# Patient Record
Sex: MALE | Race: WHITE | URBAN - METROPOLITAN AREA
[De-identification: names, ages, dates, MRNs, and addresses within clinical notes are randomized per-mention and may not be internally consistent; named-entity substitution may affect disease eponyms.]

---

## 2017-10-02 ENCOUNTER — HOSPITAL ENCOUNTER (OUTPATIENT)
Dept: BONE DENSITY | Facility: CLINIC | Age: 62
Discharge: HOME | End: 2017-10-02
Attending: INTERNAL MEDICINE | Admitting: INTERNAL MEDICINE

## 2017-10-02 DIAGNOSIS — K52.9 CHRONIC DIARRHEA: ICD-10-CM

## 2017-10-02 PROCEDURE — 77080 DXA BONE DENSITY AXIAL: CPT

## 2018-09-30 ENCOUNTER — HOSPITAL ENCOUNTER (OUTPATIENT)
Facility: CLINIC | Age: 63
Setting detail: OBSERVATION
Discharge: HOME | End: 2018-10-01
Attending: EMERGENCY MEDICINE | Admitting: INTERNAL MEDICINE
Payer: COMMERCIAL

## 2018-09-30 DIAGNOSIS — R55 NEAR SYNCOPE: ICD-10-CM

## 2018-09-30 DIAGNOSIS — I48.91 ATRIAL FIBRILLATION WITH RVR (H): ICD-10-CM

## 2018-09-30 LAB
ALBUMIN SERPL-MCNC: 3.7 G/DL (ref 3.4–5)
ALP SERPL-CCNC: 71 U/L (ref 40–150)
ALT SERPL W P-5'-P-CCNC: 49 U/L (ref 0–70)
ANION GAP SERPL CALCULATED.3IONS-SCNC: 5 MMOL/L (ref 3–14)
AST SERPL W P-5'-P-CCNC: 31 U/L (ref 0–45)
BASOPHILS # BLD AUTO: 0 10E9/L (ref 0–0.2)
BASOPHILS NFR BLD AUTO: 0.3 %
BILIRUB SERPL-MCNC: 0.7 MG/DL (ref 0.2–1.3)
BUN SERPL-MCNC: 9 MG/DL (ref 7–30)
CALCIUM SERPL-MCNC: 8.8 MG/DL (ref 8.5–10.1)
CHLORIDE SERPL-SCNC: 102 MMOL/L (ref 94–109)
CHOLEST SERPL-MCNC: 141 MG/DL
CO2 SERPL-SCNC: 31 MMOL/L (ref 20–32)
CREAT SERPL-MCNC: 0.66 MG/DL (ref 0.66–1.25)
DIFFERENTIAL METHOD BLD: ABNORMAL
EOSINOPHIL # BLD AUTO: 0.2 10E9/L (ref 0–0.7)
EOSINOPHIL NFR BLD AUTO: 3.1 %
ERYTHROCYTE [DISTWIDTH] IN BLOOD BY AUTOMATED COUNT: 12.3 % (ref 10–15)
GFR SERPL CREATININE-BSD FRML MDRD: >90 ML/MIN/1.7M2
GLUCOSE SERPL-MCNC: 114 MG/DL (ref 70–99)
HCT VFR BLD AUTO: 47.9 % (ref 40–53)
HDLC SERPL-MCNC: 79 MG/DL
HGB BLD-MCNC: 17 G/DL (ref 13.3–17.7)
IMM GRANULOCYTES # BLD: 0 10E9/L (ref 0–0.4)
IMM GRANULOCYTES NFR BLD: 0.2 %
INR PPP: 0.99 (ref 0.86–1.14)
INTERPRETATION ECG - MUSE: NORMAL
LDLC SERPL CALC-MCNC: 48 MG/DL
LMWH PPP CHRO-ACNC: 0.29 IU/ML
LYMPHOCYTES # BLD AUTO: 1.8 10E9/L (ref 0.8–5.3)
LYMPHOCYTES NFR BLD AUTO: 31.4 %
MAGNESIUM SERPL-MCNC: 2.2 MG/DL (ref 1.6–2.3)
MCH RBC QN AUTO: 34.5 PG (ref 26.5–33)
MCHC RBC AUTO-ENTMCNC: 35.5 G/DL (ref 31.5–36.5)
MCV RBC AUTO: 97 FL (ref 78–100)
MONOCYTES # BLD AUTO: 0.8 10E9/L (ref 0–1.3)
MONOCYTES NFR BLD AUTO: 12.9 %
NEUTROPHILS # BLD AUTO: 3 10E9/L (ref 1.6–8.3)
NEUTROPHILS NFR BLD AUTO: 52.1 %
NONHDLC SERPL-MCNC: 62 MG/DL
NRBC # BLD AUTO: 0 10*3/UL
NRBC BLD AUTO-RTO: 0 /100
PLATELET # BLD AUTO: 223 10E9/L (ref 150–450)
POTASSIUM SERPL-SCNC: 4.2 MMOL/L (ref 3.4–5.3)
POTASSIUM SERPL-SCNC: 4.2 MMOL/L (ref 3.4–5.3)
PROT SERPL-MCNC: 7.3 G/DL (ref 6.8–8.8)
RBC # BLD AUTO: 4.93 10E12/L (ref 4.4–5.9)
SODIUM SERPL-SCNC: 138 MMOL/L (ref 133–144)
TRIGL SERPL-MCNC: 69 MG/DL
TROPONIN I SERPL-MCNC: <0.015 UG/L (ref 0–0.04)
WBC # BLD AUTO: 5.8 10E9/L (ref 4–11)

## 2018-09-30 PROCEDURE — 25000128 H RX IP 250 OP 636: Performed by: EMERGENCY MEDICINE

## 2018-09-30 PROCEDURE — 25000132 ZZH RX MED GY IP 250 OP 250 PS 637

## 2018-09-30 PROCEDURE — 80061 LIPID PANEL: CPT | Performed by: EMERGENCY MEDICINE

## 2018-09-30 PROCEDURE — 99207 ZZC MOONLIGHTING INDICATOR: CPT | Performed by: INTERNAL MEDICINE

## 2018-09-30 PROCEDURE — 84132 ASSAY OF SERUM POTASSIUM: CPT | Performed by: INTERNAL MEDICINE

## 2018-09-30 PROCEDURE — 99214 OFFICE O/P EST MOD 30 MIN: CPT | Performed by: INTERNAL MEDICINE

## 2018-09-30 PROCEDURE — 85025 COMPLETE CBC W/AUTO DIFF WBC: CPT | Performed by: EMERGENCY MEDICINE

## 2018-09-30 PROCEDURE — 84484 ASSAY OF TROPONIN QUANT: CPT | Performed by: EMERGENCY MEDICINE

## 2018-09-30 PROCEDURE — 25000125 ZZHC RX 250: Performed by: EMERGENCY MEDICINE

## 2018-09-30 PROCEDURE — 80053 COMPREHEN METABOLIC PANEL: CPT | Performed by: EMERGENCY MEDICINE

## 2018-09-30 PROCEDURE — 36415 COLL VENOUS BLD VENIPUNCTURE: CPT | Performed by: INTERNAL MEDICINE

## 2018-09-30 PROCEDURE — 84443 ASSAY THYROID STIM HORMONE: CPT | Performed by: INTERNAL MEDICINE

## 2018-09-30 PROCEDURE — 99203 OFFICE O/P NEW LOW 30 MIN: CPT | Performed by: INTERNAL MEDICINE

## 2018-09-30 PROCEDURE — 99285 EMERGENCY DEPT VISIT HI MDM: CPT | Mod: 25

## 2018-09-30 PROCEDURE — 96365 THER/PROPH/DIAG IV INF INIT: CPT

## 2018-09-30 PROCEDURE — 93005 ELECTROCARDIOGRAM TRACING: CPT

## 2018-09-30 PROCEDURE — 85520 HEPARIN ASSAY: CPT | Performed by: INTERNAL MEDICINE

## 2018-09-30 PROCEDURE — 96376 TX/PRO/DX INJ SAME DRUG ADON: CPT

## 2018-09-30 PROCEDURE — 21400004 ZZH R&B CCU CSC INTERMEDIATE

## 2018-09-30 PROCEDURE — 25000132 ZZH RX MED GY IP 250 OP 250 PS 637: Performed by: INTERNAL MEDICINE

## 2018-09-30 PROCEDURE — 99223 1ST HOSP IP/OBS HIGH 75: CPT | Mod: AI | Performed by: INTERNAL MEDICINE

## 2018-09-30 PROCEDURE — 85610 PROTHROMBIN TIME: CPT | Performed by: INTERNAL MEDICINE

## 2018-09-30 PROCEDURE — 96361 HYDRATE IV INFUSION ADD-ON: CPT

## 2018-09-30 PROCEDURE — 83735 ASSAY OF MAGNESIUM: CPT | Performed by: INTERNAL MEDICINE

## 2018-09-30 PROCEDURE — 96368 THER/DIAG CONCURRENT INF: CPT

## 2018-09-30 RX ORDER — NALOXONE HYDROCHLORIDE 0.4 MG/ML
.1-.4 INJECTION, SOLUTION INTRAMUSCULAR; INTRAVENOUS; SUBCUTANEOUS
Status: DISCONTINUED | OUTPATIENT
Start: 2018-10-01 | End: 2018-10-01

## 2018-09-30 RX ORDER — HYDROCODONE BITARTRATE AND ACETAMINOPHEN 7.5; 325 MG/1; MG/1
1-2 TABLET ORAL EVERY 6 HOURS PRN
Status: DISCONTINUED | OUTPATIENT
Start: 2018-09-30 | End: 2018-09-30

## 2018-09-30 RX ORDER — ESCITALOPRAM OXALATE 10 MG/1
20 TABLET ORAL DAILY
Status: DISCONTINUED | OUTPATIENT
Start: 2018-09-30 | End: 2018-10-01 | Stop reason: HOSPADM

## 2018-09-30 RX ORDER — GABAPENTIN 300 MG/1
300 CAPSULE ORAL 3 TIMES DAILY
Status: DISCONTINUED | OUTPATIENT
Start: 2018-09-30 | End: 2018-10-01 | Stop reason: HOSPADM

## 2018-09-30 RX ORDER — GABAPENTIN 600 MG/1
600 TABLET ORAL 3 TIMES DAILY
Status: DISCONTINUED | OUTPATIENT
Start: 2018-09-30 | End: 2018-10-01 | Stop reason: HOSPADM

## 2018-09-30 RX ORDER — LORAZEPAM 0.5 MG/1
0.5 TABLET ORAL DAILY PRN
Status: DISCONTINUED | OUTPATIENT
Start: 2018-09-30 | End: 2018-10-01 | Stop reason: HOSPADM

## 2018-09-30 RX ORDER — PANTOPRAZOLE SODIUM 20 MG/1
20 TABLET, DELAYED RELEASE ORAL
Status: DISCONTINUED | OUTPATIENT
Start: 2018-09-30 | End: 2018-10-01 | Stop reason: HOSPADM

## 2018-09-30 RX ORDER — ASPIRIN 81 MG/1
81 TABLET ORAL DAILY
COMMUNITY
Start: 2018-04-04

## 2018-09-30 RX ORDER — BUPROPION HYDROCHLORIDE 300 MG/1
300 TABLET ORAL DAILY
COMMUNITY
Start: 2018-06-25

## 2018-09-30 RX ORDER — FLUMAZENIL 0.1 MG/ML
0.2 INJECTION, SOLUTION INTRAVENOUS
Status: DISCONTINUED | OUTPATIENT
Start: 2018-10-01 | End: 2018-10-01 | Stop reason: HOSPADM

## 2018-09-30 RX ORDER — ALBUTEROL SULFATE 90 UG/1
1 AEROSOL, METERED RESPIRATORY (INHALATION) EVERY 6 HOURS PRN
COMMUNITY
Start: 2016-06-08

## 2018-09-30 RX ORDER — DILTIAZEM HYDROCHLORIDE 5 MG/ML
15 INJECTION INTRAVENOUS ONCE
Status: COMPLETED | OUTPATIENT
Start: 2018-09-30 | End: 2018-09-30

## 2018-09-30 RX ORDER — BUPROPION HYDROCHLORIDE 150 MG/1
150 TABLET ORAL DAILY
Status: DISCONTINUED | OUTPATIENT
Start: 2018-09-30 | End: 2018-10-01 | Stop reason: HOSPADM

## 2018-09-30 RX ORDER — HEPARIN SODIUM 5000 [USP'U]/.5ML
5000 INJECTION, SOLUTION INTRAVENOUS; SUBCUTANEOUS
Status: DISCONTINUED | OUTPATIENT
Start: 2018-09-30 | End: 2018-09-30

## 2018-09-30 RX ORDER — GLYCOPYRROLATE 0.2 MG/ML
0.1 INJECTION, SOLUTION INTRAMUSCULAR; INTRAVENOUS ONCE
Status: COMPLETED | OUTPATIENT
Start: 2018-10-01 | End: 2018-10-01

## 2018-09-30 RX ORDER — SOTALOL HYDROCHLORIDE 80 MG/1
160 TABLET ORAL 2 TIMES DAILY
Status: DISCONTINUED | OUTPATIENT
Start: 2018-09-30 | End: 2018-10-01 | Stop reason: HOSPADM

## 2018-09-30 RX ORDER — ATORVASTATIN CALCIUM 20 MG/1
20 TABLET, FILM COATED ORAL DAILY
COMMUNITY
Start: 2018-06-25

## 2018-09-30 RX ORDER — HYDROCODONE BITARTRATE AND ACETAMINOPHEN 7.5; 325 MG/1; MG/1
1 TABLET ORAL EVERY 4 HOURS PRN
Status: DISCONTINUED | OUTPATIENT
Start: 2018-09-30 | End: 2018-09-30

## 2018-09-30 RX ORDER — ACETAMINOPHEN 650 MG/1
650 SUPPOSITORY RECTAL EVERY 4 HOURS PRN
Status: DISCONTINUED | OUTPATIENT
Start: 2018-09-30 | End: 2018-09-30

## 2018-09-30 RX ORDER — AMOXICILLIN 250 MG
2 CAPSULE ORAL 2 TIMES DAILY PRN
Status: DISCONTINUED | OUTPATIENT
Start: 2018-09-30 | End: 2018-10-01 | Stop reason: HOSPADM

## 2018-09-30 RX ORDER — ONDANSETRON 2 MG/ML
4 INJECTION INTRAMUSCULAR; INTRAVENOUS EVERY 6 HOURS PRN
Status: DISCONTINUED | OUTPATIENT
Start: 2018-09-30 | End: 2018-10-01 | Stop reason: HOSPADM

## 2018-09-30 RX ORDER — HYDROCODONE BITARTRATE AND ACETAMINOPHEN 7.5; 325 MG/1; MG/1
1-2 TABLET ORAL EVERY 4 HOURS PRN
Status: DISCONTINUED | OUTPATIENT
Start: 2018-09-30 | End: 2018-10-01 | Stop reason: HOSPADM

## 2018-09-30 RX ORDER — ASPIRIN 81 MG/1
324 TABLET, CHEWABLE ORAL ONCE
Status: COMPLETED | OUTPATIENT
Start: 2018-09-30 | End: 2018-09-30

## 2018-09-30 RX ORDER — ALBUTEROL SULFATE 90 UG/1
1 AEROSOL, METERED RESPIRATORY (INHALATION) EVERY 6 HOURS PRN
Status: DISCONTINUED | OUTPATIENT
Start: 2018-09-30 | End: 2018-10-01 | Stop reason: HOSPADM

## 2018-09-30 RX ORDER — AMOXICILLIN 250 MG
1 CAPSULE ORAL 2 TIMES DAILY PRN
Status: DISCONTINUED | OUTPATIENT
Start: 2018-09-30 | End: 2018-10-01 | Stop reason: HOSPADM

## 2018-09-30 RX ORDER — ATORVASTATIN CALCIUM 20 MG/1
20 TABLET, FILM COATED ORAL EVERY EVENING
Status: DISCONTINUED | OUTPATIENT
Start: 2018-09-30 | End: 2018-10-01 | Stop reason: HOSPADM

## 2018-09-30 RX ORDER — TRAZODONE HYDROCHLORIDE 100 MG/1
100 TABLET ORAL AT BEDTIME
COMMUNITY
Start: 2018-06-07

## 2018-09-30 RX ORDER — FENTANYL CITRATE 50 UG/ML
50-100 INJECTION, SOLUTION INTRAMUSCULAR; INTRAVENOUS
Status: COMPLETED | OUTPATIENT
Start: 2018-10-01 | End: 2018-10-01

## 2018-09-30 RX ORDER — ESCITALOPRAM OXALATE 20 MG/1
20 TABLET ORAL DAILY
COMMUNITY
Start: 2018-08-22

## 2018-09-30 RX ORDER — SODIUM CHLORIDE 9 MG/ML
1000 INJECTION, SOLUTION INTRAVENOUS CONTINUOUS
Status: DISCONTINUED | OUTPATIENT
Start: 2018-10-01 | End: 2018-10-01 | Stop reason: HOSPADM

## 2018-09-30 RX ORDER — ASPIRIN 81 MG/1
81 TABLET ORAL DAILY
Status: DISCONTINUED | OUTPATIENT
Start: 2018-09-30 | End: 2018-09-30

## 2018-09-30 RX ORDER — HEPARIN SODIUM 5000 [USP'U]/.5ML
5000 INJECTION, SOLUTION INTRAVENOUS; SUBCUTANEOUS EVERY 12 HOURS
Status: DISCONTINUED | OUTPATIENT
Start: 2018-09-30 | End: 2018-09-30

## 2018-09-30 RX ORDER — LORAZEPAM 0.5 MG/1
0.5 TABLET ORAL DAILY PRN
COMMUNITY
Start: 2018-08-24

## 2018-09-30 RX ORDER — BUPROPION HYDROCHLORIDE 150 MG/1
300 TABLET ORAL DAILY
Status: DISCONTINUED | OUTPATIENT
Start: 2018-09-30 | End: 2018-10-01 | Stop reason: HOSPADM

## 2018-09-30 RX ORDER — MAGNESIUM SULFATE HEPTAHYDRATE 40 MG/ML
2 INJECTION, SOLUTION INTRAVENOUS
Status: DISCONTINUED | OUTPATIENT
Start: 2018-10-01 | End: 2018-10-01 | Stop reason: HOSPADM

## 2018-09-30 RX ORDER — FENTANYL CITRATE 50 UG/ML
25-50 INJECTION, SOLUTION INTRAMUSCULAR; INTRAVENOUS
Status: DISCONTINUED | OUTPATIENT
Start: 2018-10-01 | End: 2018-10-01 | Stop reason: HOSPADM

## 2018-09-30 RX ORDER — GABAPENTIN 600 MG/1
600 TABLET ORAL 3 TIMES DAILY
COMMUNITY
Start: 2016-07-10

## 2018-09-30 RX ORDER — GABAPENTIN 300 MG/1
300 CAPSULE ORAL 3 TIMES DAILY
COMMUNITY
Start: 2018-06-18

## 2018-09-30 RX ORDER — ALUMINA, MAGNESIA, AND SIMETHICONE 2400; 2400; 240 MG/30ML; MG/30ML; MG/30ML
30 SUSPENSION ORAL EVERY 4 HOURS PRN
Status: DISCONTINUED | OUTPATIENT
Start: 2018-09-30 | End: 2018-10-01 | Stop reason: HOSPADM

## 2018-09-30 RX ORDER — POTASSIUM CHLORIDE 1500 MG/1
40 TABLET, EXTENDED RELEASE ORAL
Status: DISCONTINUED | OUTPATIENT
Start: 2018-10-01 | End: 2018-10-01 | Stop reason: HOSPADM

## 2018-09-30 RX ORDER — NALOXONE HYDROCHLORIDE 0.4 MG/ML
.1-.4 INJECTION, SOLUTION INTRAMUSCULAR; INTRAVENOUS; SUBCUTANEOUS
Status: DISCONTINUED | OUTPATIENT
Start: 2018-09-30 | End: 2018-10-01 | Stop reason: HOSPADM

## 2018-09-30 RX ORDER — TRAZODONE HYDROCHLORIDE 50 MG/1
100 TABLET, FILM COATED ORAL AT BEDTIME
Status: DISCONTINUED | OUTPATIENT
Start: 2018-09-30 | End: 2018-10-01 | Stop reason: HOSPADM

## 2018-09-30 RX ORDER — NITROGLYCERIN 0.4 MG/1
0.4 TABLET SUBLINGUAL EVERY 5 MIN PRN
Status: DISCONTINUED | OUTPATIENT
Start: 2018-09-30 | End: 2018-10-01 | Stop reason: HOSPADM

## 2018-09-30 RX ORDER — ONDANSETRON 4 MG/1
4 TABLET, ORALLY DISINTEGRATING ORAL EVERY 6 HOURS PRN
Status: DISCONTINUED | OUTPATIENT
Start: 2018-09-30 | End: 2018-10-01 | Stop reason: HOSPADM

## 2018-09-30 RX ORDER — HYDROMORPHONE HYDROCHLORIDE 1 MG/ML
.3-.5 INJECTION, SOLUTION INTRAMUSCULAR; INTRAVENOUS; SUBCUTANEOUS
Status: DISCONTINUED | OUTPATIENT
Start: 2018-09-30 | End: 2018-10-01 | Stop reason: HOSPADM

## 2018-09-30 RX ORDER — HYDROCODONE BITARTRATE AND ACETAMINOPHEN 7.5; 325 MG/1; MG/1
1-2 TABLET ORAL EVERY 6 HOURS PRN
COMMUNITY
Start: 2017-07-25

## 2018-09-30 RX ORDER — SOTALOL HYDROCHLORIDE 160 MG/1
160 TABLET ORAL 2 TIMES DAILY
COMMUNITY
Start: 2018-04-16

## 2018-09-30 RX ORDER — ACETAMINOPHEN 325 MG/1
650 TABLET ORAL EVERY 4 HOURS PRN
Status: DISCONTINUED | OUTPATIENT
Start: 2018-09-30 | End: 2018-09-30

## 2018-09-30 RX ORDER — POTASSIUM CHLORIDE 1500 MG/1
20 TABLET, EXTENDED RELEASE ORAL
Status: DISCONTINUED | OUTPATIENT
Start: 2018-10-01 | End: 2018-10-01 | Stop reason: HOSPADM

## 2018-09-30 RX ORDER — BUPROPION HYDROCHLORIDE 150 MG/1
150 TABLET ORAL DAILY
COMMUNITY
Start: 2018-04-04

## 2018-09-30 RX ORDER — DILTIAZEM HYDROCHLORIDE 5 MG/ML
10 INJECTION INTRAVENOUS ONCE
Status: DISCONTINUED | OUTPATIENT
Start: 2018-09-30 | End: 2018-10-01 | Stop reason: HOSPADM

## 2018-09-30 RX ORDER — LIDOCAINE 40 MG/G
CREAM TOPICAL
Status: DISCONTINUED | OUTPATIENT
Start: 2018-09-30 | End: 2018-10-01 | Stop reason: HOSPADM

## 2018-09-30 RX ORDER — ATROPINE SULFATE 0.1 MG/ML
.5-1 INJECTION INTRAVENOUS
Status: DISCONTINUED | OUTPATIENT
Start: 2018-10-01 | End: 2018-10-01 | Stop reason: HOSPADM

## 2018-09-30 RX ORDER — SILDENAFIL 100 MG/1
100 TABLET, FILM COATED ORAL DAILY PRN
COMMUNITY
Start: 2016-06-08

## 2018-09-30 RX ADMIN — PANTOPRAZOLE SODIUM 20 MG: 20 TABLET, DELAYED RELEASE ORAL at 16:13

## 2018-09-30 RX ADMIN — GABAPENTIN 600 MG: 600 TABLET, FILM COATED ORAL at 21:16

## 2018-09-30 RX ADMIN — HEPARIN SODIUM 1000 UNITS/HR: 10000 INJECTION, SOLUTION INTRAVENOUS at 10:24

## 2018-09-30 RX ADMIN — ATORVASTATIN CALCIUM 20 MG: 20 TABLET, FILM COATED ORAL at 20:03

## 2018-09-30 RX ADMIN — HYDROCODONE BITARTRATE AND ACETAMINOPHEN 1 TABLET: 7.5; 325 TABLET ORAL at 22:08

## 2018-09-30 RX ADMIN — Medication 5100 UNITS: at 10:24

## 2018-09-30 RX ADMIN — HYDROCODONE BITARTRATE AND ACETAMINOPHEN 1 TABLET: 7.5; 325 TABLET ORAL at 17:58

## 2018-09-30 RX ADMIN — BUPROPION HYDROCHLORIDE 150 MG: 150 TABLET, FILM COATED, EXTENDED RELEASE ORAL at 14:42

## 2018-09-30 RX ADMIN — GABAPENTIN 600 MG: 600 TABLET, FILM COATED ORAL at 16:13

## 2018-09-30 RX ADMIN — ASPIRIN 81 MG 324 MG: 81 TABLET ORAL at 14:38

## 2018-09-30 RX ADMIN — SOTALOL HYDROCHLORIDE 160 MG: 80 TABLET ORAL at 20:03

## 2018-09-30 RX ADMIN — ESCITALOPRAM OXALATE 20 MG: 10 TABLET ORAL at 14:38

## 2018-09-30 RX ADMIN — HYDROCODONE BITARTRATE AND ACETAMINOPHEN 1 TABLET: 7.5; 325 TABLET ORAL at 13:57

## 2018-09-30 RX ADMIN — SODIUM CHLORIDE 1000 ML: 9 INJECTION, SOLUTION INTRAVENOUS at 08:22

## 2018-09-30 RX ADMIN — GABAPENTIN 300 MG: 300 CAPSULE ORAL at 21:16

## 2018-09-30 RX ADMIN — TRAZODONE HYDROCHLORIDE 100 MG: 50 TABLET ORAL at 22:08

## 2018-09-30 RX ADMIN — GABAPENTIN 300 MG: 300 CAPSULE ORAL at 16:13

## 2018-09-30 RX ADMIN — DILTIAZEM HYDROCHLORIDE 5 MG/HR: 5 INJECTION INTRAVENOUS at 10:14

## 2018-09-30 RX ADMIN — BUPROPION HYDROCHLORIDE 300 MG: 150 TABLET, FILM COATED, EXTENDED RELEASE ORAL at 14:38

## 2018-09-30 RX ADMIN — APIXABAN 5 MG: 5 TABLET, FILM COATED ORAL at 21:16

## 2018-09-30 RX ADMIN — DILTIAZEM HYDROCHLORIDE 15 MG: 5 INJECTION INTRAVENOUS at 08:40

## 2018-09-30 ASSESSMENT — ENCOUNTER SYMPTOMS
VOMITING: 0
LIGHT-HEADEDNESS: 1
DIARRHEA: 0
DYSURIA: 0
PALPITATIONS: 1
ABDOMINAL PAIN: 0

## 2018-09-30 ASSESSMENT — PAIN DESCRIPTION - DESCRIPTORS
DESCRIPTORS: ACHING
DESCRIPTORS: ACHING

## 2018-09-30 ASSESSMENT — ACTIVITIES OF DAILY LIVING (ADL)
ADLS_ACUITY_SCORE: 9
ADLS_ACUITY_SCORE: 9

## 2018-09-30 NOTE — ED NOTES
"[facility]  ED Nurse Handoff Report    ED Chief complaint: Palpitations      ED Diagnosis:   Final diagnoses:   Near syncope   Atrial fibrillation with RVR (H)       Code Status: Full Code    Allergies: No Known Allergies    Activity level - Baseline/Home:  Independent    Activity Level - Current:   Independent     Needed?: No    Isolation: No  Infection: Not Applicable  Bariatric?: No    Vital Signs:   Vitals:    09/30/18 1009 09/30/18 1020 09/30/18 1021 09/30/18 1030   BP: 113/88 105/79  114/85   Pulse:       Resp:       Temp:       SpO2: 95% 93% 95% 93%   Weight:       Height:           Cardiac Rhythm: ,    a-fib w/ cvr    Pain level:  0    Is this patient confused?: No   [place] - Suicide Severity Rating Scale Completed?  Yes  If yes, what color did the patient score?  White    Patient Report: Initial Complaint: palpitations- pt felt he was in a-fib.   Focused Assessment: Pt reports hx of a-fib, states can't feel when he is in a-fib \"because the skin on the lt side is numb from a neck problem\". Pt reports that he checked his bp yesterday and the hr was 130. Pt reports rechecking this am and hr continues to be in the 110's. Pt states has had afib in the past which required cardioversion. Pt requested cardioversion today as he wanted to go home. MD informed pt since unsure how long he was in a-fib. Pt received 15mg IVP diltiazem and hr decreased to 80-90s. Pt started on diltiazem gtt at 5mg/hr. VSS  Tests Performed: labs  Abnormal Results: BG elevated  Treatments provided: as above    Family Comments: not at bedside    OBS brochure/video discussed/provided to patient/family: N/A              Name of person given brochure if not patient:               Relationship to patient:     ED Medications:   Medications   diltiazem (CARDIZEM) injection 10 mg (not administered)   diltiazem (CARDIZEM) 125 mg in sodium chloride 0.9 % 125 mL infusion (5 mg/hr Intravenous New Bag 9/30/18 1014) "   heparin infusion 25,000 units in 0.45% NaCl 250 mL (1,000 Units/hr Intravenous New Bag 9/30/18 1024)   0.9% sodium chloride BOLUS (0 mLs Intravenous Stopped 9/30/18 1014)   diltiazem (CARDIZEM) injection 15 mg (15 mg Intravenous Given 9/30/18 0840)   heparin Loading Dose bolus dose from infusion pump 5,100 Units (5,100 Units Intravenous Given 9/30/18 1024)       Drips infusing?:  Yes    For the majority of the shift this patient was Green.   Interventions performed were .    Severe Sepsis OR Septic Shock Diagnosis Present: No    To be done/followed up on inpatient unit:  pt will have cardioversion by cards    ED NURSE PHONE NUMBER: *[phone]

## 2018-09-30 NOTE — IP AVS SNAPSHOT
MRN:[id number]                      After Visit Summary   9/30/2018    Richie Hines    MRN: [id number]           Thank you!     Thank you for choosing [place] for your care. Our goal is always to provide you with excellent care. Hearing back from our patients is one way we can continue to improve our services. Please take a few minutes to complete the written survey that you may receive in the mail after you visit with us. Thank you!        Patient Information     Date Of Birth          1955        Designated Caregiver       Most Recent Value    Caregiver    Will someone help with your care after discharge? yes    Name of designated caregiver Ilene Almaraz    Phone number of caregiver [phone]    Caregiver address [address]      About your hospital stay     You were admitted on:  September 30, 2018 You last received care in the:  [facility]    You were discharged on:  October 1, 2018        Reason for your hospital stay       afib with RVR, requiring repeat cardioversion. You will need to be on Eliquis for 30 days following cardioversion.                  Who to Call     For medical emergencies, please call 911.  For non-urgent questions about your medical care, please call your primary care provider or clinic, None  For questions related to your surgery, please call your surgery clinic        Attending Provider     Provider Specialty    Feliberto Messina MD Emergency Medicine    Natali Enrique MD Internal Medicine    Andrei Enrique MD Internal Medicine       Primary Care Provider Fax #    Physician No Ref-Primary [phone]      After Care Instructions     Activity       Your activity upon discharge: activity as tolerated            Diet       Follow this diet upon discharge: Orders Placed This Encounter      Regular Diet Adult                  Follow-up Appointments     Follow-up and recommended labs and tests      "   Follow up with primary care provider, within 1-2 weeks, for hospital follow- up. No follow up labs or test are needed.    Follow up with cardiologist as directed.                  Pending Results     Date and Time Order Name Status Description    10/1/2018 0828 Transesophageal Echocardiogram In process             Statement of Approval     Ordered          10/01/18 1522  I have reviewed and agree with all the recommendations and orders detailed in this document.  EFFECTIVE NOW     Approved and electronically signed by:  Osvaldo Perez PA-C             Admission Information     Date & Time Provider Department Dept. Phone    2018 Andrei Enrique MD [facility] [phone]      Your Vitals Were     Blood Pressure Pulse Temperature Respirations Height Weight    94/60 (BP Location: Left arm) 50 98  F (36.7  C) (Oral) 18 1.854 m (6' 1\") 88.5 kg (195 lb)    Pulse Oximetry BMI (Body Mass Index)                98% 25.73 kg/m2          [employer] Information     [employer] lets you send messages to your doctor, view your test results, renew your prescriptions, schedule appointments and more. To sign up, go to [email or web address] . Click on \"Log in\" on the left side of the screen, which will take you to the Welcome page. Then click on \"Sign up Now\" on the right side of the page.     You will be asked to enter the access code listed below, as well as some personal information. Please follow the directions to create your username and password.     Your access code is: [email or web address]  Expires: 2018  3:30 PM     Your access code will  in 90 days. If you need help or a new code, please call your [place] clinic or [phone].        Care EveryWhere ID     This is your Care EveryWhere ID. This could be used by other organizations to access your [place] medical records  [id number]        Equal Access to Services     SHARMAINE SAUNDERS AH: esmer Yu, " heather sharp elviamarion, jae sumaines shearer'aan ah. So [facility] [phone].    ATENCIÓN: Si diana hammond, tiene a szymanski disposición servicios gratuitos de asistencia lingüística. Llsulma al [phone].    We comply with applicable federal civil rights laws and [place] laws. We do not discriminate on the basis of race, color, national origin, age, disability, sex, sexual orientation, or gender identity.               Review of your medicines      START taking        Dose / Directions    apixaban ANTICOAGULANT 5 MG tablet   Commonly known as:  ELIQUIS   Used for:  Atrial fibrillation with RVR (H)        Dose:  5 mg   Take 1 tablet (5 mg) by mouth 2 times daily   Quantity:  60 tablet   Refills:  0         CONTINUE these medicines which have NOT CHANGED        Dose / Directions    albuterol 108 (90 Base) MCG/ACT inhaler   Commonly known as:  PROAIR HFA/PROVENTIL HFA/VENTOLIN HFA        Dose:  1 puff   Inhale 1 puff into the lungs every 6 hours as needed   Refills:  0       aspirin 81 MG EC tablet        Dose:  81 mg   Take 81 mg by mouth daily   Refills:  0       atorvastatin 20 MG tablet   Commonly known as:  LIPITOR        Dose:  20 mg   Take 20 mg by mouth daily   Refills:  0       * buPROPion 150 MG 24 hr tablet   Commonly known as:  WELLBUTRIN XL        Dose:  150 mg   Take 150 mg by mouth daily With 300 mg dose to = 450 mg daily   Refills:  0       * buPROPion 300 MG 24 hr tablet   Commonly known as:  WELLBUTRIN XL        Dose:  300 mg   Take 300 mg by mouth daily With 150 mg dose to = 450 mg daily   Refills:  0       escitalopram 20 MG tablet   Commonly known as:  LEXAPRO        Dose:  20 mg   Take 20 mg by mouth daily   Refills:  0       esomeprazole 20 MG CR capsule   Commonly known as:  nexIUM        Dose:  20 mg   Take 20 mg by mouth daily   Refills:  0       * gabapentin 600 MG tablet   Commonly known as:  NEURONTIN        Dose:  600 mg   Take 600 mg by mouth 3 times daily With 300 mg dose to  = 900 mg   Refills:  0       * gabapentin 300 MG capsule   Commonly known as:  NEURONTIN        Dose:  300 mg   Take 300 mg by mouth 3 times daily With 600 mg dose to = 900 mg   Refills:  0       HYDROcodone-acetaminophen 7.5-325 MG per tablet   Commonly known as:  NORCO        Dose:  1-2 tablet   Take 1-2 tablets by mouth every 6 hours as needed   Refills:  0       LORazepam 0.5 MG tablet   Commonly known as:  ATIVAN        Dose:  0.5 mg   Take 0.5 mg by mouth daily as needed   Refills:  0       sildenafil 100 MG tablet   Commonly known as:  VIAGRA        Dose:  100 mg   Take 100 mg by mouth daily as needed   Refills:  0       sotalol 160 MG tablet   Commonly known as:  BETAPACE        Dose:  160 mg   Take 160 mg by mouth 2 times daily   Refills:  0       traZODone 100 MG tablet   Commonly known as:  DESYREL        Dose:  100 mg   Take 100 mg by mouth At Bedtime   Refills:  0       * Notice:  This list has 4 medication(s) that are the same as other medications prescribed for you. Read the directions carefully, and ask your doctor or other care provider to review them with you.         Where to get your medicines      These medications were sent to [facility] [place] - [address]  [address] [facility] [address], [facility] [address]     Phone:  [phone]     apixaban ANTICOAGULANT 5 MG tablet                Protect others around you: Learn how to safely use, store and throw away your medicines at www.disposemymeds.org.        Information about OPIOIDS     PRESCRIPTION OPIOIDS: WHAT YOU NEED TO KNOW   We gave you an opioid (narcotic) pain medicine. It is important to manage your pain, but opioids are not always the best choice. You should first try all the other options your care team gave you. Take this medicine for as short a time (and as few doses) as possible.    Some activities can increase your pain, such as bandage changes or therapy sessions. It may help to take your pain medicine 30 to  60 minutes before these activities. Reduce your stress by getting enough sleep, working on hobbies you enjoy and practicing relaxation or meditation. Talk to your care team about ways to manage your pain beyond prescription opioids.    These medicines have risks:    DO NOT drive when on new or higher doses of pain medicine. These medicines can affect your alertness and reaction times, and you could be arrested for driving under the influence (DUI). If you need to use opioids long-term, talk to your care team about driving.    DO NOT operate heavy machinery    DO NOT do any other dangerous activities while taking these medicines.    DO NOT drink any alcohol while taking these medicines.     If the opioid prescribed includes acetaminophen, DO NOT take with any other medicines that contain acetaminophen. Read all labels carefully. Look for the word  acetaminophen  or  Tylenol.  Ask your pharmacist if you have questions or are unsure.    You can get addicted to pain medicines, especially if you have a history of addiction (chemical, alcohol or substance dependence). Talk to your care team about ways to reduce this risk.    All opioids tend to cause constipation. Drink plenty of water and eat foods that have a lot of fiber, such as fruits, vegetables, prune juice, apple juice and high-fiber cereal. Take a laxative (Miralax, milk of magnesia, Colace, Senna) if you don t move your bowels at least every other day. Other side effects include upset stomach, sleepiness, dizziness, throwing up, tolerance (needing more of the medicine to have the same effect), physical dependence and slowed breathing.    Store your pills in a secure place, locked if possible. We will not replace any lost or stolen medicine. If you don t finish your medicine, please throw away (dispose) as directed by your pharmacist. The [place] [employer]ution [employer] has more information about safe disposal:  https://www.pca.[facility].mn.us/living-green/managing-unwanted-medications             Medication List: This is a list of all your medications and when to take them. Check marks below indicate your daily home schedule. Keep this list as a reference.      Medications           Morning Afternoon Evening Bedtime As Needed    albuterol 108 (90 Base) MCG/ACT inhaler   Commonly known as:  PROAIR HFA/PROVENTIL HFA/VENTOLIN HFA   Inhale 1 puff into the lungs every 6 hours as needed                                   apixaban ANTICOAGULANT 5 MG tablet   Commonly known as:  ELIQUIS   Take 1 tablet (5 mg) by mouth 2 times daily   Last time this was given:  5 mg on 10/1/2018  9:00 AM                                      aspirin 81 MG EC tablet   Take 81 mg by mouth daily                                   atorvastatin 20 MG tablet   Commonly known as:  LIPITOR   Take 20 mg by mouth daily   Last time this was given:  20 mg on 9/30/2018  8:03 PM                                   * buPROPion 150 MG 24 hr tablet   Commonly known as:  WELLBUTRIN XL   Take 150 mg by mouth daily With 300 mg dose to = 450 mg daily   Last time this was given:  300 mg on 10/1/2018  9:00 AM                                   * buPROPion 300 MG 24 hr tablet   Commonly known as:  WELLBUTRIN XL   Take 300 mg by mouth daily With 150 mg dose to = 450 mg daily   Last time this was given:  300 mg on 10/1/2018  9:00 AM                                   escitalopram 20 MG tablet   Commonly known as:  LEXAPRO   Take 20 mg by mouth daily   Last time this was given:  20 mg on 10/1/2018  8:58 AM                                   esomeprazole 20 MG CR capsule   Commonly known as:  nexIUM   Take 20 mg by mouth daily                                   * gabapentin 600 MG tablet   Commonly known as:  NEURONTIN   Take 600 mg by mouth 3 times daily With 300 mg dose to = 900 mg   Last time this was given:  600 mg on 10/1/2018  9:03 AM                                         *  gabapentin 300 MG capsule   Commonly known as:  NEURONTIN   Take 300 mg by mouth 3 times daily With 600 mg dose to = 900 mg   Last time this was given:  300 mg on 10/1/2018  8:58 AM                                         HYDROcodone-acetaminophen 7.5-325 MG per tablet   Commonly known as:  NORCO   Take 1-2 tablets by mouth every 6 hours as needed   Last time this was given:  1 tablet on 10/1/2018 12:48 PM                                   LORazepam 0.5 MG tablet   Commonly known as:  ATIVAN   Take 0.5 mg by mouth daily as needed                                   sildenafil 100 MG tablet   Commonly known as:  VIAGRA   Take 100 mg by mouth daily as needed                                   sotalol 160 MG tablet   Commonly known as:  BETAPACE   Take 160 mg by mouth 2 times daily   Last time this was given:  160 mg on 10/1/2018  8:58 AM                                      traZODone 100 MG tablet   Commonly known as:  DESYREL   Take 100 mg by mouth At Bedtime   Last time this was given:  100 mg on 9/30/2018 10:08 PM                                   * Notice:  This list has 4 medication(s) that are the same as other medications prescribed for you. Read the directions carefully, and ask your doctor or other care provider to review them with you.              More Information        Electrical Cardioversion     Cut away image of heart showing SA node, right atrium, AV node, and left atrium   You had a cardioversion today. This is an electrical shock applied to the chest. The shock reset your heart rhythm back to normal. Your chest wall and chest muscles may feel sore for a few days. Some redness may appear on the skin on your chest where the cardioversion patches were applied. That will go away within a week.  To get ready for this procedure, you may have been given medicine to help you relax and to reduce pain. Depending on the medicine used, it could take up to 8 hours for the effect to wear off.  Home care  Follow  these guidelines when caring for yourself at home:    You should be watched by a responsible adult for the next 8 hours. This is in case your condition gets worse.    Don t take any oral medicine for pain or sleep during the next 4 hours. These medicines might react with the medicines you were given in the hospital. This can cause a much stronger response than usual.    Don t drink any alcohol for the next 24 hours.    Don t drive or operate dangerous machinery during the next 24 hours.    Your healthcare provider will have prescribed medicines to stop the abnormal heart rhythm from coming back. Take these medicines as directed. Expect to take blood thinners for at least 4 weeks. This course of blood thinners should not be interrupted. Do not schedule other invasive procedures during this time. Interrupting this medicine could increase your risk for a stroke after a cardioversion.    You may use acetaminophen or ibuprofen to control pain, unless another pain medicine was prescribed. If you have chronic liver or kidney disease, talk with your provider before taking these medicines. Also talk with your provider if you ve had a stomach ulcer or gastrointestinal bleeding.  Follow-up care  Follow up with your healthcare provider, or as advised, if you aren t alert and back to your usual level of activity within 12 hours.   Call 911  Call 911 if you have:     Pain in your chest, arm, shoulder, neck or upper back    You have problems speaking or seeing    Weakness in an arm or leg    You are unable to move your arm or leg on one side of your body    You have uncrontrolled bleeding from blood thinning medicines   When to seek medical advice  Call your healthcare provider right away if any of these occur:    Weakness, dizziness, lightheadedness, or fainting    Shortness of breath    You feel like your heart is fluttering or beating fast, hard, or irregularly (palpitations)    More than minor skin discomfort or redness where  the cardioversion pads were placed   Date Last Reviewed: 1/1/2017 2000-2017 [employer]. [address] [place] [address], [place] [address]. All rights reserved. This information is not intended as a substitute for professional medical care. Always follow your healthcare professional's instructions.

## 2018-09-30 NOTE — IP AVS SNAPSHOT
[facility]    [address] [place] [address], [address]    [place] [address]    Phone:  [phone]                                       After Visit Summary   9/30/2018    Richie Hines    MRN: [id number]           After Visit Summary Signature Page     I have received my discharge instructions, and my questions have been answered. I have discussed any challenges I see with this plan with the nurse or doctor.    ..........................................................................................................................................  Patient/Patient Representative Signature      ..........................................................................................................................................  Patient Representative Print Name and Relationship to Patient    ..................................................               ................................................  Date                                   Time    ..........................................................................................................................................  Reviewed by Signature/Title    ...................................................              ..............................................  Date                                               Time          22EPIC Rev 08/18

## 2018-09-30 NOTE — ED PROVIDER NOTES
History     Chief Complaint:  Palpitations    HPI   Richie Hines is a 62 year old male on a daily aspirin and Sotalol with a history of A-fib who presents to the emergency department today for evaluation of palpations. The patient has had A-fib twice before, both times requiring cardioversion. He states that since yesterday he has been having episodes of palpitations feeling like his heart briefly stops, and has recorded high heart rates in the 130's while resting. He denies having any vomiting, diarrhea, alcohol use, abdominal pain, dysuria, and rash. He has been having light-headedness though, which is reportedly common for him when he has atrial fibrillation.  He states that he probably has A-fib more often than just these 3 times, but that he doesn't have a lot of feeling in his chest area and cannot feel the palpitations well.    Allergies:  No Known Drug Allergies    Medications:    Sotalol     Past Medical History:    Afib    Past Surgical History:    History reviewed. No pertinent surgical history.    Family History:    History reviewed. No pertinent family history.    Social History:  The patient was accompanied to the ED by nobody.  Smoking Status: Current Everyday Smoker   Smokeless Tobacco: Never Used  Alcohol Use: Positive   Marital Status:       Review of Systems   Cardiovascular: Positive for palpitations. Negative for chest pain.   Gastrointestinal: Negative for abdominal pain, diarrhea and vomiting.   Genitourinary: Negative for dysuria.   Skin: Negative for rash.   Neurological: Positive for light-headedness.   All other systems reviewed and are negative.    Physical Exam     Patient Vitals for the past 24 hrs:   BP Temp Pulse Heart Rate Resp SpO2 Height Weight   09/30/18 0910 112/77 - - 80 - - - -   09/30/18 0900 116/82 - - 75 14 95 % - -   09/30/18 0850 121/84 - - 74 - 94 % - -   09/30/18 0840 (!) 112/91 - - 111 - 96 % - -   09/30/18 0830 (!) 122/94 - - 101 - 97 % - -   09/30/18 0813 (!)  "139/113 - - 111 16 - - -   09/30/18 0804 131/72 98.7  F (37.1  C) 101 - 16 98 % 1.854 m (6' 1\") 93 kg (205 lb)      Physical Exam  Vitals: reviewed by me  General: [place], cooperative, and alert to conversation  Eyes: Tracking well, clear conjunctiva BL  ENT: MMM, midline trachea.   Lungs:   No tachypnea, no accessory muscle use. No respiratory distress.   CV: Rate as above, irreg irreg rhythm.    Abd: Soft, non tender, no guarding, no rebound. Non distended  MSK: no peripheral edema or joint effusion.  No evidence of trauma  Skin: No rash, normal turgor and temperature  Neuro: Clear speech and no facial droop.  Psych: Not RIS, no e/o AH/VH      Emergency Department Course     ECG:  ECG taken at 0756, ECG read at 0807  Atrial fibrillation with RVR  Septal infarct, age undetermined  Rate 117 bpm. GA interval * ms. QRS duration 84 ms. QT/QTc 352/491 ms. P-R-T axes * 62 51.    Laboratory:  Laboratory findings were communicated with the patient who voiced understanding of the findings.    CBC: WBC 5.8, HGB 17.0,   CMP: Glucose 114, o/w WNL (Creatinine 0.66)  Troponin I: <0.01    Interventions:  0822 NS, 1 L, IV  0840 Diltiazem 15 mg IV  1014 Diltiazem 5 mg/hr, 125 mg, IV  1024 Heparin infusion 1000 units/hr, 96463 units, IV    Emergency Department Course:    0807 Nursing notes and vitals reviewed.    0810 I performed an exam of the patient as documented above.     0822 IV was inserted and blood was drawn for laboratory testing, results above.     0940 I spoke with Dr. Carlisle of the Cardiology service for Dr. Graham regarding patient's presentation, findings, and plan of care.     0942 I personally reviewed the lab results with the patient and answered all related questions prior to admission.    0947 I spoke with Dr. Enrique of the Hospitalist service regarding patient's presentation, findings, and plan of care.     Impression & Plan      Medical Decision Making:  Richie Hines is a 62 " year old male who presents to the emergency department today for evaluation of 2 near syncopal episodes. this is likely due to A-fib with RVR versus his orthostatic hypotension. he was in A-fib with RVR to the 120's here in the Emergency Department, and has responed well to 25 mg Diltiazem. He is not on any blood thinners, given that he is a fall risk, but I do think that he would benefit from further evaluation given his two recent near syncopal episodes and the fact that he is in RVR here in the ED. I talked to the patients cardiologist on call, Dr. Carlisle, who is covering for Dr. Murphy. Dr. Carlisle is of the mind that the patient would benefit from Heparinization and possible TE-ECHO with cardioversion tomorrow, which I agree. He has had this done in the past and has done well. Will plan for admission, spoke with Dr. Enrique who generously agreed to accept [place] of the [place] [facility]     Critical care time 35 minutes    Diagnosis:    ICD-10-CM    1. Near syncope R55    2. Atrial fibrillation with RVR (H) I48.91      Disposition:   Admission    Scribe Disclosure:  Maxx BURCH, am serving as a scribe at 8:07 AM on 9/30/2018 to document services personally performed by Feliberto Messina MD based on my observations and the provider's statements to me.      EMERGENCY DEPARTMENT       Feliberto Messina MD  09/30/18 2450       Feliberto Messina MD  09/30/18 3713

## 2018-09-30 NOTE — PLAN OF CARE
Problem: Patient Care Overview  Goal: Plan of Care/Patient Progress Review  Outcome: No Change  VSS, tolerating dilt gtt with HR in the 70's-80's, ambulates in halls independently. NPO after MN for KI and DCCV 10/1. Chronic back pain-PRN norco. Is considering back surgery. Plan for AC is to swtich from heparin to eliquis tonight, will need eliquis coverage check tomorrow.

## 2018-09-30 NOTE — H&P
[facility]    History and Physical  Hospitalist       Date of Admission:  9/30/2018    Assessment & Plan   62 y.o. gentleman with a current history of tobacco use, ascending aortic aneurysm (4.4 cm), paroxysmal atrial fibrillation, hypertension, and atherosclerotic heart disease (per CTA) who comes today for symptomatic afib.    Problem 1: Symptomatic afib  -Heart rate much better controlled now after getting diltiazem in the ED and on a diltiazem drip  -We will plan for KI cardioversion tomorrow after obtaining a cardiology consult.  We will continue the sotalol at this current time as well.    Problem 2: Depression  -Continue bupropion and Lexapro.    Problem 3: Neuropathy  -Continue home medicine gabapentin at 900 3 times daily.    # Pain Assessment:   Richie badillo pain level was assessed and he currently denies pain.      DVT Prophylaxis: Heparin gtt  Code Status: Full Code    Disposition: Expected discharge in  1-2 days once KI cardioversion.    Natali Enrique MD    Primary Care Physician   Physician No Ref-Primary    Chief Complaint   Afib symptomatic    History is obtained from the patient    History of Present Illness   Richie Hines is a 62 year old male on a daily aspirin and Sotalol with a history of A-fib who presents to the emergency department today for evaluation of palpations. The patient has had A-fib twice before, both times requiring cardioversion. He states that since yesterday he has been having episodes of palpitations feeling like his heart briefly stops, and has recorded high heart rates in the 130's while resting. He denies having any vomiting, diarrhea, alcohol use, abdominal pain, dysuria, and rash. He has been having light-headedness though, which is reportedly common for him when he has atrial fibrillation.  He states that he probably has A-fib more often than just these 3 times, but that he doesn't have a lot of feeling in his chest area and cannot feel the palpitations well.  Patient currently on aspirin 81 mg. In the past he was told that he was a fall risk due to his leg weakness and orthostatic-like symptoms and while he was in [place] recommendations were made for aspirin only. CT angiogram in 2016 showed mild coronary disease.    In the emergency room he received diltiazem 50 mg IV and later was started on a diltiazem drip at 5 mg an hour.  He was also started on heparin infusion as well.  The ED spoke to the cardiology service Dr. Carlisle who stated that the patient likely will need transesophageal echocardiogram followed by cardioversion tomorrow        Past Medical History    I have reviewed this patient's medical history and updated it with pertinent information if needed.   Past Medical History:   Diagnosis Date     A-fib (H)        Past Surgical History   I have reviewed this patient's surgical history and updated it with pertinent information if needed.  No past surgical history on file.    Prior to Admission Medications   Prior to Admission Medications   Prescriptions Last Dose Informant Patient Reported? Taking?   HYDROcodone-acetaminophen (NORCO) 7.5-325 MG per tablet 9/30/2018 at AM x 2 tab  Yes Yes   Sig: Take 1-2 tablets by mouth every 6 hours as needed   LORazepam (ATIVAN) 0.5 MG tablet PRN  Yes Yes   Sig: Take 0.5 mg by mouth daily as needed   albuterol (PROAIR HFA/PROVENTIL HFA/VENTOLIN HFA) 108 (90 Base) MCG/ACT inhaler PRN  Yes Yes   Sig: Inhale 1 puff into the lungs every 6 hours as needed   aspirin 81 MG EC tablet 9/29/2018 at Unknown time  Yes Yes   Sig: Take 81 mg by mouth daily   atorvastatin (LIPITOR) 20 MG tablet 9/29/2018 at PM  Yes Yes   Sig: Take 20 mg by mouth daily   buPROPion (WELLBUTRIN XL) 150 MG 24 hr tablet 9/29/2018 at Unknown time  Yes Yes   Sig: Take 150 mg by mouth daily With 300 mg dose to = 450 mg daily   buPROPion (WELLBUTRIN XL) 300 MG 24 hr tablet 9/29/2018 at Unknown time  Yes Yes   Sig: Take 300 mg by mouth daily With 150 mg dose to =  450 mg daily   escitalopram (LEXAPRO) 20 MG tablet 9/29/2018 at Unknown time  Yes Yes   Sig: Take 20 mg by mouth daily   esomeprazole (NEXIUM) 20 MG CR capsule 9/29/2018 at Unknown time  Yes Yes   Sig: Take 20 mg by mouth daily   gabapentin (NEURONTIN) 300 MG capsule 9/30/2018 at AM x 1  Yes Yes   Sig: Take 300 mg by mouth 3 times daily With 600 mg dose to = 900 mg    gabapentin (NEURONTIN) 600 MG tablet 9/30/2018 at AM x 1  Yes Yes   Sig: Take 600 mg by mouth 3 times daily With 300 mg dose to = 900 mg   sildenafil (VIAGRA) 100 MG tablet PRN  Yes Yes   Sig: Take 100 mg by mouth daily as needed   sotalol (BETAPACE) 160 MG tablet 9/30/2018 at AM  Yes Yes   Sig: Take 160 mg by mouth 2 times daily   traZODone (DESYREL) 100 MG tablet   Yes Yes   Sig: Take 100 mg by mouth At Bedtime      Facility-Administered Medications: None     Allergies   No Known Allergies    Social History   I have reviewed this patient's social history and updated it with pertinent information if needed. Richie Donny  reports that he has been smoking.  He has never used smokeless tobacco. He reports that he drinks alcohol. He reports that he does not use illicit drugs.    Family History   I have reviewed this patient's family history and updated it with pertinent information if needed.   No family history on file.    Review of Systems   The 10 point Review of Systems is negative other than noted in the HPI or here.     Physical Exam   Temp: 98.7  F (37.1  C)   BP: 130/68 Pulse: 73 Heart Rate: 77 Resp: 16 SpO2: 97 % O2 Device: None (Room air)    Vital Signs with Ranges  Temp:  [98.7  F (37.1  C)] 98.7  F (37.1  C)  Pulse:  [] 73  Heart Rate:  [] 77  Resp:  [0-19] 16  BP: (105-139)/() 130/68  SpO2:  [93 %-98 %] 97 %  202 lbs 4.8 oz    GEN: NAD, pleasant  HEENT: no icterus  CV: RRR, normal s1/s2, no murmurs/rubs/s3/s4, no heave.   CHEST: CTAB  ABD: soft, NT/ND, NABS  : no flank/suprapubic tenderness  NEURO: AA&Ox3,  fluent/appropriate, motor grossly nonfocal  PSYCH: cooperative, affect appropriate    Data   Data reviewed today:  I personally reviewed no images or EKG's today.    Recent Labs  Lab 09/30/18  0818   WBC 5.8   HGB 17.0   MCV 97         POTASSIUM 4.2   CHLORIDE 102   CO2 31   BUN 9   CR 0.66   ANIONGAP 5   MAYURI 8.8   *   ALBUMIN 3.7   PROTTOTAL 7.3   BILITOTAL 0.7   ALKPHOS 71   ALT 49   AST 31   TROPI <0.015       Imaging:  No results found for this or any previous visit (from the past 24 hour(s)).

## 2018-09-30 NOTE — PLAN OF CARE
Problem: Patient Care Overview  Goal: Plan of Care/Patient Progress Review  Outcome: No Change  VSS. Monitor remains Atrial fib with CVR. Norco given for back pain. Diltiazem drip at 5 mg/hr. Heparin at 1000 unit(s)/hr. Plan for Cardioversion tomorrow. Continue to monitor and observe closely.

## 2018-09-30 NOTE — CONSULTS
[facility]  Cardiology Consultation     Date of Admission:  9/30/2018    Reason for Consult   Paroxysmal Afib with RVR    Primary Care Physician   *Physician No Ref-Primary    History of Present Illness   Richie Hines is a 62 year old male with PMHx of hypertension, ascending aortic aneurysm (4.4 cm), tobacco use, coronary calcification and paroxysmal atrial fibrillation on sotalol and aspirin who presents with symptomatic atrial fibrillation with RVR for the last day.    The patient reported intermittent palpitations for the last two days. He reports a pulse rate at home of 130 bpm and presyncopal symptoms. He denies chest pain, dyspnea or syncope. He states that he can not reliably feel palpitaitons and can not say how long he has been in atrial fibrillation. In the ED, is ECG showed Afib with RVR ( 117 bpm) no significant S/T wave changes. Initial troponin was negative. Electrolytes WNL. He received diltiazem 25 mg and was placed on an IV drip at 5 mg/hr with an improvement in HR control. He was admitted for KI guided cardioversion. Cardiology was consulted to assist.    Echocardiogram 8/2017 demonstrated normal LV size with EF 60-65%. Ascending aorta dilation 4.4 cm and no significant valve disease. The patient follows with the [facility] cardiology. He reports two prior episodes of paroxysmal atrial fibrillation which required cardioversion. He reports compliance with Sotalol 160 mg BID. He denies a trial of other antiarrhythmics or rate controlling agents. No excessive EtOH or caffeine use. No recent infections. No chest pain, dyspnea, orthopnea, or LE edema.     Past Medical History   Past Medical History:   Diagnosis Date     A-fib (H)        Past Surgical History   No past surgical history on file.    Prior to Admission Medications   Prior to Admission Medications   Prescriptions Last Dose Informant Patient Reported? Taking?   HYDROcodone-acetaminophen (NORCO) 7.5-325 MG per tablet  9/30/2018 at AM x 2 tab  Yes Yes   Sig: Take 1-2 tablets by mouth every 6 hours as needed   LORazepam (ATIVAN) 0.5 MG tablet PRN  Yes Yes   Sig: Take 0.5 mg by mouth daily as needed   albuterol (PROAIR HFA/PROVENTIL HFA/VENTOLIN HFA) 108 (90 Base) MCG/ACT inhaler PRN  Yes Yes   Sig: Inhale 1 puff into the lungs every 6 hours as needed   aspirin 81 MG EC tablet 9/29/2018 at Unknown time  Yes Yes   Sig: Take 81 mg by mouth daily   atorvastatin (LIPITOR) 20 MG tablet 9/29/2018 at PM  Yes Yes   Sig: Take 20 mg by mouth daily   buPROPion (WELLBUTRIN XL) 150 MG 24 hr tablet 9/29/2018 at Unknown time  Yes Yes   Sig: Take 150 mg by mouth daily With 300 mg dose to = 450 mg daily   buPROPion (WELLBUTRIN XL) 300 MG 24 hr tablet 9/29/2018 at Unknown time  Yes Yes   Sig: Take 300 mg by mouth daily With 150 mg dose to = 450 mg daily   escitalopram (LEXAPRO) 20 MG tablet 9/29/2018 at Unknown time  Yes Yes   Sig: Take 20 mg by mouth daily   esomeprazole (NEXIUM) 20 MG CR capsule 9/29/2018 at Unknown time  Yes Yes   Sig: Take 20 mg by mouth daily   gabapentin (NEURONTIN) 300 MG capsule 9/30/2018 at AM x 1  Yes Yes   Sig: Take 300 mg by mouth 3 times daily With 600 mg dose to = 900 mg    gabapentin (NEURONTIN) 600 MG tablet 9/30/2018 at AM x 1  Yes Yes   Sig: Take 600 mg by mouth 3 times daily With 300 mg dose to = 900 mg   sildenafil (VIAGRA) 100 MG tablet PRN  Yes Yes   Sig: Take 100 mg by mouth daily as needed   sotalol (BETAPACE) 160 MG tablet 9/30/2018 at AM  Yes Yes   Sig: Take 160 mg by mouth 2 times daily   traZODone (DESYREL) 100 MG tablet   Yes Yes   Sig: Take 100 mg by mouth At Bedtime      Facility-Administered Medications: None     Allergies   No Known Allergies    Social History   Social History     Social History     Marital status:      Spouse name: N/A     Number of children: N/A     Years of education: N/A     Occupational History     Not on file.     Social History Main Topics     Smoking status: Current  Every Day Smoker     Smokeless tobacco: Never Used     Alcohol use Yes     Drug use: No     Sexual activity: Not on file     Other Topics Concern     Not on file     Social History Narrative     No narrative on file       Family History   No family history on file.    Review of Systems   All systems reviewed and negative, except as noted in HPI.     Physical Exam   Vital Signs with Ranges  Temp:  [98.7  F (37.1  C)] 98.7  F (37.1  C)  Pulse:  [] 73  Heart Rate:  [] 77  Resp:  [0-19] 16  BP: (105-139)/() 130/68  SpO2:  [93 %-98 %] 97 %  202 lbs 4.8 oz    Constitutional: No apparent distress.   Eyes: No xanthelasma or conjunctivitis  HEENT: Moist oral mucosa  Respiratory: Clear to auscultation bilaterally. No crackles or wheezes.  Cardiovascular: Irreg Irreg. Normal rate. Normal S1 and S2. No murmurs. No extra heart sounds. No JVD.   Lymph/Hematologic: No purpura or petechiae.  Skin: No stasis dermatitis. No major rashes.  Extremities: No peripheral edema.  Neurologic: Moving all extremities. No facial assymmetry.  Psychiatric: Alert and oriented. Answers questions appropriately.      Assessment & Plan   Richie Hines is a 62 year old male who was admitted on 9/30/2018 with paroxysmal atrial fibrillation with RVR.    #1 Paroxysmal atrial fibrillation  -Continue sotalol 160 mg BID  -Continue Diltiazem drip, goal HR <110 bpm, until cardioversion  -Eliquis 5 mg BID, will require for minimum of 4 weeks after cardioversion may discuss with general cardiologist if he wishes to continue, although, given a [id number] score of (1-HTN) no clear role for anti-coagulation until age 65  -KI guided cardioversion in AM, NPO at Midnight  #2 Hypertension  -Monitor BP, off lisinopril for the last several month per cardiologist's recommendations  #3 Coronary calcifications  -Conitnue home atorvastatin  -Hold aspirin while on anti-coagulation  #3 Nicotine dependence  -Tobacco cessation counseling       Stefani Stringer,  MD    Code Status    Full Code

## 2018-09-30 NOTE — PROGRESS NOTES
RECEIVING UNIT ED HANDOFF REVIEW    ED Nurse Handoff Report was reviewed by: Enid Proctor on September 30, 2018 at 11:05 AM

## 2018-10-01 ENCOUNTER — ANESTHESIA (OUTPATIENT)
Dept: SURGERY | Facility: CLINIC | Age: 63
End: 2018-10-01
Payer: COMMERCIAL

## 2018-10-01 ENCOUNTER — ANESTHESIA EVENT (OUTPATIENT)
Dept: SURGERY | Facility: CLINIC | Age: 63
End: 2018-10-01
Payer: COMMERCIAL

## 2018-10-01 ENCOUNTER — APPOINTMENT (OUTPATIENT)
Dept: CARDIOLOGY | Facility: CLINIC | Age: 63
End: 2018-10-01
Attending: NURSE PRACTITIONER
Payer: COMMERCIAL

## 2018-10-01 VITALS
HEIGHT: 73 IN | DIASTOLIC BLOOD PRESSURE: 60 MMHG | SYSTOLIC BLOOD PRESSURE: 94 MMHG | OXYGEN SATURATION: 98 % | HEART RATE: 50 BPM | WEIGHT: 195 LBS | BODY MASS INDEX: 25.84 KG/M2 | TEMPERATURE: 98 F | RESPIRATION RATE: 18 BRPM

## 2018-10-01 PROBLEM — I48.91 A-FIB (H): Status: ACTIVE | Noted: 2018-10-01

## 2018-10-01 LAB — TSH SERPL DL<=0.005 MIU/L-ACNC: 1.08 MU/L (ref 0.4–4)

## 2018-10-01 PROCEDURE — 25000132 ZZH RX MED GY IP 250 OP 250 PS 637: Performed by: INTERNAL MEDICINE

## 2018-10-01 PROCEDURE — 37000008 ZZH ANESTHESIA TECHNICAL FEE, 1ST 30 MIN

## 2018-10-01 PROCEDURE — 25800025 ZZH RX 258: Performed by: INTERNAL MEDICINE

## 2018-10-01 PROCEDURE — 92960 CARDIOVERSION ELECTRIC EXT: CPT | Performed by: INTERNAL MEDICINE

## 2018-10-01 PROCEDURE — 25000132 ZZH RX MED GY IP 250 OP 250 PS 637

## 2018-10-01 PROCEDURE — 40000858 ZZH STATISTIC CARDIOVERSION

## 2018-10-01 PROCEDURE — 40000010 ZZH STATISTIC ANES STAT CODE-CRNA PER MINUTE

## 2018-10-01 PROCEDURE — G0378 HOSPITAL OBSERVATION PER HR: HCPCS

## 2018-10-01 PROCEDURE — 93325 DOPPLER ECHO COLOR FLOW MAPG: CPT | Mod: 26 | Performed by: INTERNAL MEDICINE

## 2018-10-01 PROCEDURE — 25000128 H RX IP 250 OP 636: Performed by: NURSE ANESTHETIST, CERTIFIED REGISTERED

## 2018-10-01 PROCEDURE — 93320 DOPPLER ECHO COMPLETE: CPT | Mod: 26 | Performed by: INTERNAL MEDICINE

## 2018-10-01 PROCEDURE — 93312 ECHO TRANSESOPHAGEAL: CPT

## 2018-10-01 PROCEDURE — 40000235 ZZH STATISTIC TELEMETRY

## 2018-10-01 PROCEDURE — 40000857 ZZH STATISTIC TEE INCLUDES SEDATION

## 2018-10-01 PROCEDURE — 99217 ZZC OBSERVATION CARE DISCHARGE: CPT | Performed by: INTERNAL MEDICINE

## 2018-10-01 PROCEDURE — 25000128 H RX IP 250 OP 636: Performed by: INTERNAL MEDICINE

## 2018-10-01 PROCEDURE — 93312 ECHO TRANSESOPHAGEAL: CPT | Mod: 26 | Performed by: INTERNAL MEDICINE

## 2018-10-01 PROCEDURE — 25000125 ZZHC RX 250: Performed by: INTERNAL MEDICINE

## 2018-10-01 RX ORDER — FENTANYL CITRATE 50 UG/ML
25-50 INJECTION, SOLUTION INTRAMUSCULAR; INTRAVENOUS
Status: DISCONTINUED | OUTPATIENT
Start: 2018-10-01 | End: 2018-10-01

## 2018-10-01 RX ORDER — PROPOFOL 10 MG/ML
INJECTION, EMULSION INTRAVENOUS PRN
Status: DISCONTINUED | OUTPATIENT
Start: 2018-10-01 | End: 2018-10-01

## 2018-10-01 RX ORDER — FLUMAZENIL 0.1 MG/ML
0.2 INJECTION, SOLUTION INTRAVENOUS
Status: DISCONTINUED | OUTPATIENT
Start: 2018-10-01 | End: 2018-10-01

## 2018-10-01 RX ORDER — POTASSIUM CHLORIDE 1500 MG/1
40 TABLET, EXTENDED RELEASE ORAL
Status: DISCONTINUED | OUTPATIENT
Start: 2018-10-01 | End: 2018-10-01 | Stop reason: HOSPADM

## 2018-10-01 RX ORDER — POTASSIUM CHLORIDE 1500 MG/1
20 TABLET, EXTENDED RELEASE ORAL
Status: DISCONTINUED | OUTPATIENT
Start: 2018-10-01 | End: 2018-10-01

## 2018-10-01 RX ORDER — LIDOCAINE HYDROCHLORIDE 40 MG/ML
1.5 SOLUTION TOPICAL ONCE
Status: COMPLETED | OUTPATIENT
Start: 2018-10-01 | End: 2018-10-01

## 2018-10-01 RX ORDER — GLYCOPYRROLATE 0.2 MG/ML
0.1 INJECTION, SOLUTION INTRAMUSCULAR; INTRAVENOUS ONCE
Status: DISCONTINUED | OUTPATIENT
Start: 2018-10-01 | End: 2018-10-01

## 2018-10-01 RX ORDER — MAGNESIUM SULFATE HEPTAHYDRATE 40 MG/ML
2 INJECTION, SOLUTION INTRAVENOUS
Status: DISCONTINUED | OUTPATIENT
Start: 2018-10-01 | End: 2018-10-01 | Stop reason: HOSPADM

## 2018-10-01 RX ORDER — NALOXONE HYDROCHLORIDE 0.4 MG/ML
.1-.4 INJECTION, SOLUTION INTRAMUSCULAR; INTRAVENOUS; SUBCUTANEOUS
Status: DISCONTINUED | OUTPATIENT
Start: 2018-10-01 | End: 2018-10-01

## 2018-10-01 RX ORDER — POTASSIUM CHLORIDE 1500 MG/1
40 TABLET, EXTENDED RELEASE ORAL
Status: DISCONTINUED | OUTPATIENT
Start: 2018-10-01 | End: 2018-10-01

## 2018-10-01 RX ORDER — DEXTROSE MONOHYDRATE 25 G/50ML
9.5 INJECTION, SOLUTION INTRAVENOUS
Status: DISCONTINUED | OUTPATIENT
Start: 2018-10-01 | End: 2018-10-01 | Stop reason: HOSPADM

## 2018-10-01 RX ORDER — POTASSIUM CHLORIDE 1500 MG/1
20 TABLET, EXTENDED RELEASE ORAL
Status: DISCONTINUED | OUTPATIENT
Start: 2018-10-01 | End: 2018-10-01 | Stop reason: HOSPADM

## 2018-10-01 RX ORDER — FENTANYL CITRATE 50 UG/ML
50-100 INJECTION, SOLUTION INTRAMUSCULAR; INTRAVENOUS
Status: DISCONTINUED | OUTPATIENT
Start: 2018-10-01 | End: 2018-10-01

## 2018-10-01 RX ORDER — ATROPINE SULFATE 0.1 MG/ML
.5-1 INJECTION INTRAVENOUS
Status: DISCONTINUED | OUTPATIENT
Start: 2018-10-01 | End: 2018-10-01

## 2018-10-01 RX ORDER — SODIUM CHLORIDE 9 MG/ML
1000 INJECTION, SOLUTION INTRAVENOUS CONTINUOUS
Status: DISCONTINUED | OUTPATIENT
Start: 2018-10-01 | End: 2018-10-01

## 2018-10-01 RX ORDER — MAGNESIUM SULFATE HEPTAHYDRATE 40 MG/ML
2 INJECTION, SOLUTION INTRAVENOUS
Status: DISCONTINUED | OUTPATIENT
Start: 2018-10-01 | End: 2018-10-01

## 2018-10-01 RX ADMIN — MIDAZOLAM HYDROCHLORIDE 1 MG: 1 INJECTION, SOLUTION INTRAMUSCULAR; INTRAVENOUS at 10:50

## 2018-10-01 RX ADMIN — BUPROPION HYDROCHLORIDE 300 MG: 150 TABLET, FILM COATED, EXTENDED RELEASE ORAL at 09:00

## 2018-10-01 RX ADMIN — GABAPENTIN 300 MG: 300 CAPSULE ORAL at 08:58

## 2018-10-01 RX ADMIN — HYDROCODONE BITARTRATE AND ACETAMINOPHEN 2 TABLET: 7.5; 325 TABLET ORAL at 05:53

## 2018-10-01 RX ADMIN — MIDAZOLAM HYDROCHLORIDE 1 MG: 1 INJECTION, SOLUTION INTRAMUSCULAR; INTRAVENOUS at 10:52

## 2018-10-01 RX ADMIN — MIDAZOLAM HYDROCHLORIDE 1 MG: 1 INJECTION, SOLUTION INTRAMUSCULAR; INTRAVENOUS at 10:57

## 2018-10-01 RX ADMIN — ESCITALOPRAM OXALATE 20 MG: 10 TABLET ORAL at 08:58

## 2018-10-01 RX ADMIN — APIXABAN 5 MG: 5 TABLET, FILM COATED ORAL at 09:00

## 2018-10-01 RX ADMIN — GLYCOPYRROLATE 0.1 MG: 0.2 INJECTION, SOLUTION INTRAMUSCULAR; INTRAVENOUS at 10:13

## 2018-10-01 RX ADMIN — MIDAZOLAM HYDROCHLORIDE 2 MG: 1 INJECTION, SOLUTION INTRAMUSCULAR; INTRAVENOUS at 10:44

## 2018-10-01 RX ADMIN — SOTALOL HYDROCHLORIDE 160 MG: 80 TABLET ORAL at 08:58

## 2018-10-01 RX ADMIN — DEXTROSE MONOHYDRATE 9.5 ML: 25 INJECTION, SOLUTION INTRAVENOUS at 11:11

## 2018-10-01 RX ADMIN — PANTOPRAZOLE SODIUM 20 MG: 20 TABLET, DELAYED RELEASE ORAL at 12:48

## 2018-10-01 RX ADMIN — FENTANYL CITRATE 50 MCG: 50 INJECTION INTRAMUSCULAR; INTRAVENOUS at 10:46

## 2018-10-01 RX ADMIN — BUPROPION HYDROCHLORIDE 150 MG: 150 TABLET, FILM COATED, EXTENDED RELEASE ORAL at 08:59

## 2018-10-01 RX ADMIN — PROPOFOL 80 MG: 10 INJECTION, EMULSION INTRAVENOUS at 11:30

## 2018-10-01 RX ADMIN — GABAPENTIN 600 MG: 600 TABLET, FILM COATED ORAL at 09:03

## 2018-10-01 RX ADMIN — HYDROCODONE BITARTRATE AND ACETAMINOPHEN 1 TABLET: 7.5; 325 TABLET ORAL at 12:48

## 2018-10-01 RX ADMIN — SODIUM CHLORIDE 1000 ML: 9 INJECTION, SOLUTION INTRAVENOUS at 10:03

## 2018-10-01 RX ADMIN — LIDOCAINE HYDROCHLORIDE 1.5 ML: 40 SOLUTION TOPICAL at 10:18

## 2018-10-01 ASSESSMENT — ENCOUNTER SYMPTOMS
DYSRHYTHMIAS: 1
SEIZURES: 0

## 2018-10-01 ASSESSMENT — ACTIVITIES OF DAILY LIVING (ADL)
ADLS_ACUITY_SCORE: 9

## 2018-10-01 ASSESSMENT — LIFESTYLE VARIABLES: TOBACCO_USE: 1

## 2018-10-01 ASSESSMENT — PAIN DESCRIPTION - DESCRIPTORS
DESCRIPTORS: ACHING
DESCRIPTORS: ACHING

## 2018-10-01 ASSESSMENT — COPD QUESTIONNAIRES: COPD: 0

## 2018-10-01 NOTE — PROGRESS NOTES
1000 A/O. Pt denies difficulty swallowing or sleep apnea. No dentures or loose teeth. NPO since last evening.  Discharge / post procedure instructions given to pt pre procedure w/ verbal understanding received.  All questions & concerns addressed.   1035 Consent signed and time out done at this time  1042  MD Sedation Assessment completed at this time.    KI: Pt tolerated well. VSS.  Cardizem gtt off dues to SBP in 90's. Total sedation given - 5 mg Versed & 50 mcg Fentanyl. Dr. Botello spoke with pt  post procedure.See KI Flowsheet.   CRNA and Anesthesiologist were here to assist with DCCV.  Pt received 150 Joule and converted from AF to SB in 50's   1150  Pt transferred to [facility]. Detailed report called to bedside RN [place].  Resp even & unlabored upon transfer. Pt  A/O. Pt to be NPO until 1330.  Both pt & nurse informed.

## 2018-10-01 NOTE — ANESTHESIA CARE TRANSFER NOTE
Patient: Richie Hines    Procedure(s):  KI, CARDIOVERSION    Diagnosis: UNKNOWN  Diagnosis Additional Information: No value filed.    Anesthesia Type:   MAC     Note:  Airway :Nasal Cannula  Patient transferred to:Telemetry/Step Down Unit  Comments: At end of procedure, spontaneous respirations, patient alert to voice, able to follow commands. Oxygen via nasal cannula at 4 liters per minute to PACU. Oxygen tubing connected to wall O2 in PACU, SpO2, NiBP, and EKG monitors and alarms on and functioning, Richardson Hugger warmer connected to patient gown, report on patient's clinical status given to PACU RN, RN questions answered.    Diagnosis: A-Fib  Procedure: Cardioversion  Cardiologist: Dr. Botello  [facility] Report: Identifed the Patient, Identified the Reponsible Provider, Reviewed the pertinent medical history, Discussed the surgical course, Reviewed Intra-OP anesthesia mangement and issues during anesthesia, Set expectations for post-procedure period and Allowed opportunity for questions and acknowledgement of understanding      Vitals: (Last set prior to Anesthesia Care Transfer)    CRNA VITALS  10/1/2018 1105 - 10/1/2018 1141      10/1/2018             NIBP: 100/62    Pulse: 58    SpO2: 97 %                Electronically Signed By: SANDRA Carrillo CRNA  October 1, 2018  11:41 AM

## 2018-10-01 NOTE — PLAN OF CARE
Problem: Patient Care Overview  Goal: Plan of Care/Patient Progress Review  Outcome: Adequate for Discharge Date Met: 10/01/18  Patient discharged to home at 1535.  Discharge paperwork explained and all questions answered.  IV out, tele off.  Plan to follow up with primary cardiology and primary MD. Thomas filled and sent home with patient.  All belongings sent home with patient.

## 2018-10-01 NOTE — PROGRESS NOTES
[facility]  EP Cardiology Progress Note  Date of Service: 10/01/2018  Primary Cardiologist: Dr. Iveth Barone MD at [facility]    Assessment & Plan    Richie Hines is a 62 year old male with PMHx of hypertension, ascending aortic aneurysm (4.4 cm), tobacco use, coronary calcification and paroxysmal atrial fibrillation on sotalol and aspirin who presents with symptomatic atrial fibrillation with RVR on 9/30/2018.   He was having palpitations for two days prior to admission and his HR was 130s at home.  He has undergone successful DCCV twice in the past.   Diltiazem 25 mg was given initially and he was placed on a diltiazem IV drip at 5 mg/hr with an improvement in HR control.  ECHO (8/2017) revealed normal LV size with EF 60-65% and ascending aorta dilation 4.4 cm and no significant valve disease.    Patient is currently smoking tobacco.      Interval History  Continues in atrial fibrillation with rate of 96 bpm.  Has no complaints.  VS reviewed Afebrile, HR  bpm, BP /, oxygen saturation % on room air.  Eliquis was started on 9/30/18 and heparin gtt was stopped.        Assessment/Plan:  1.  Atrial fibrillation  Continue sotalol 160 mg twice daily  Diltiazem gtt   Eliquis 5 mg twice daily, will require for minimum of 4 weeks after cardioversion may discuss with general cardiologist if he wishes to continue, although, given a [id number] score of (1-HTN).    Proceed with KI guided cardioversion.  Patient has been NPO since Midnight  Consent is signed    Tobacco abuse  Cessation discussed    SANDRA Gil CNP  P Heart  Text Page  (M-F 7:30 am - 4:00 pm)      Physical Exam   Temp: 97.6  F (36.4  C) Temp src: Oral BP: 115/82 Pulse: 95 Heart Rate: 98 Resp: 16 SpO2: 100 % O2 Device: None (Room air)    Vitals:    09/30/18 0804 09/30/18 1230 10/01/18 0548   Weight: 93 kg (205 lb) 91.8 kg (202 lb 4.8 oz) 88.5 kg (195 lb)       GEN:  In general, this is a  well nourished male in no acute distress  Patient ambulatory.  HEENT:  Pupils normal size, equal, and nround. Sclerae nonicteric. Clear oropharynx. Mucous membranes moist,  no cyanosis.  NECK: Supple, no asymmetry, masses, or scars appreciated. Trachea midline.  C/V:  iregular rhythm and normal rate, no murmur, rub or gallop. No S3 or RV heave.   RESP: Respirations are unlabored. Wheezes on right base.  No use of accessory muscles. Left Clear to auscultation bilaterally without wheezing, rales, or rhonchi.  GI: Abdomen soft, nontender, nondistended.   EXTREM: No LE edema. No cyanosis or clubbing.  MS: Large joints without obvious swelling or erythema.   VASC: Radial and dorsalis pedis are normal in volumes and symmetric bilaterally.   NEURO: Alert and oriented, cooperative.  No obvious focal deficits.   PSYCH: Normal affect.  SKIN: Warm and dry.     Medications     diltiazem (CARDIZEM) infusion ADULT 5 mg/hr (10/01/18 0555)     - MEDICATION INSTRUCTIONS -       sodium chloride         apixaban ANTICOAGULANT  5 mg Oral BID     atorvastatin  20 mg Oral QPM     benzocaine 20%  1-4 spray Mouth/Throat Once     buPROPion  150 mg Oral Daily     buPROPion  300 mg Oral Daily     diltiazem  10 mg Intravenous Once     escitalopram  20 mg Oral Daily     fentaNYL   mcg Intravenous Once within 24 hrs     gabapentin  300 mg Oral TID     gabapentin  600 mg Oral TID     glycopyrrolate  0.1 mg Intravenous Once     midazolam  1-2 mg Intravenous Once within 24 hrs     pantoprazole  20 mg Oral QAM AC     sodium chloride (PF)  3 mL Intracatheter Q8H     sodium chloride (PF)  3 mL Intravenous Q8H     sotalol  160 mg Oral BID     traZODone  100 mg Oral At Bedtime       Data   Most Recent 3 CBC's:  Recent Labs   Lab Test  09/30/18   0818   WBC  5.8   HGB  17.0   MCV  97   PLT  223     Most Recent 3 BMP's:  Recent Labs   Lab Test  09/30/18   1510  09/30/18   0818   NA   --   138   POTASSIUM  4.2  4.2   CHLORIDE   --   102   CO2   --    31   BUN   --   9   CR   --   0.66   ANIONGAP   --   5   MAYURI   --   8.8   GLC   --   114*     Most Recent 3 INR's:  Recent Labs   Lab Test  09/30/18   1510   INR  0.99     Most Recent TSH and T4:No lab results found.      Last 24 hours labs reviewed

## 2018-10-01 NOTE — DISCHARGE SUMMARY
[facility]    Discharge Summary  Hospitalist    Date of Admission:  9/30/2018  Date of Discharge:  10/1/2018  Discharging Provider: Osvaldo Perez PA-C    Discharge Diagnoses      Near syncope  Atrial fibrillation with RVR (H)    History of Present Illness   Richie Hines is an 62 year old male history of tobacco use, ascending aortic aneurysm (4.4 cm), paroxysmal atrial fibrillation, hypertension, and atherosclerotic heart disease (per CTA) who presented to the ED for palpitations, identified to have symptomatic afib.    Hospital Course   Richie Hines was admitted on 9/30/2018.  The following problems were addressed during his hospitalization:    Afib with RVR  Pt with hx paroxysmal afib, is s/p cardioversions in past. Maintained PTA on sotalol 160mg BID. Follows with cardiology at outside clinic. [id number] 1. Pt was initiated on a diltiazem drip for control of hemodynamics and was seen by electrophysiology, repeat cardioversion recommended and was completed 10/1 without complication. Post-procedure patient was stable and comfortable discharging home, was sent with Eliquis which he will need to take for 4 weeks. He will follow-up with his primary cardiologist regarding hospital stay and continued Eliquis. He is also continued on his PTA sotalol, dose-adjustment deferred to primary cardiologist.     Depression. Continue bupropion and Lexapro.     Neuropathy. Continue home medicine gabapentin at 900 3 times daily.    Osvaldo Perez PA-C    Significant Results and Procedures   As noted    Pending Results   These results will be followed up by n/a  Unresulted Labs Ordered in the Past 30 Days of this Admission     No orders found for last 61 day(s).          Code Status   Full Code       Primary Care Physician   Physician No Ref-Primary    Physical Exam   Temp: 98  F (36.7  C) Temp src: Oral BP: 94/60 Pulse: 50 Heart Rate: 54 Resp: 18 SpO2: 98 % O2 Device: None (Room air) Oxygen Delivery: 2 LPM  Vitals:     09/30/18 0804 09/30/18 1230 10/01/18 0548   Weight: 93 kg (205 lb) 91.8 kg (202 lb 4.8 oz) 88.5 kg (195 lb)     Vital Signs with Ranges  Temp:  [97.6  F (36.4  C)-98  F (36.7  C)] 98  F (36.7  C)  Pulse:  [50-97] 50  Heart Rate:  [] 54  Resp:  [14-18] 18  BP: ()/(56-97) 94/60  SpO2:  [94 %-100 %] 98 %  I/O last 3 completed shifts:  In: 547.84 [P.O.:480; I.V.:67.84]  Out: 2200 [Urine:2200]    GEN: well-developed, well-nourished, appears comfortable  PULM: lungs CTA bilaterally, no increased work of breathing, no wheeze, rales, rhonchi  CV: RRR, S1 & S2, no murmur  GI: soft, nontender, nondistended, no guarding or rigidity  SKIN: warm & dry without rash, no pedal edema    Discharge Disposition   Discharged to home  Condition at discharge: Stable    Consultations This Hospital Stay   PHARMACY TO DOSE HEPARIN  CARDIOLOGY IP CONSULT  CARDIOLOGY IP CONSULT  SMOKING CESSATION PROGRAM IP CONSULT    Time Spent on this Encounter   I, Osvaldo Perez, personally saw the patient today and spent less than or equal to 30 minutes discharging this patient.    Discharge Orders     Reason for your hospital stay   afib with RVR, requiring repeat cardioversion. You will need to be on Eliquis for 30 days following cardioversion.     Follow-up and recommended labs and tests    Follow up with primary care provider, within 1-2 weeks, for hospital follow- up. No follow up labs or test are needed.    Follow up with cardiologist as directed.     Activity   Your activity upon discharge: activity as tolerated     Diet   Follow this diet upon discharge: Orders Placed This Encounter     Regular Diet Adult       Discharge Medications   Current Discharge Medication List      START taking these medications    Details   apixaban ANTICOAGULANT (ELIQUIS) 5 MG tablet Take 1 tablet (5 mg) by mouth 2 times daily  Qty: 60 tablet, Refills: 0    Associated Diagnoses: Atrial fibrillation with RVR (H)         CONTINUE these medications which have  NOT CHANGED    Details   albuterol (PROAIR HFA/PROVENTIL HFA/VENTOLIN HFA) 108 (90 Base) MCG/ACT inhaler Inhale 1 puff into the lungs every 6 hours as needed      aspirin 81 MG EC tablet Take 81 mg by mouth daily      atorvastatin (LIPITOR) 20 MG tablet Take 20 mg by mouth daily      !! buPROPion (WELLBUTRIN XL) 150 MG 24 hr tablet Take 150 mg by mouth daily With 300 mg dose to = 450 mg daily      !! buPROPion (WELLBUTRIN XL) 300 MG 24 hr tablet Take 300 mg by mouth daily With 150 mg dose to = 450 mg daily      escitalopram (LEXAPRO) 20 MG tablet Take 20 mg by mouth daily      esomeprazole (NEXIUM) 20 MG CR capsule Take 20 mg by mouth daily      gabapentin (NEURONTIN) 300 MG capsule Take 300 mg by mouth 3 times daily With 600 mg dose to = 900 mg       gabapentin (NEURONTIN) 600 MG tablet Take 600 mg by mouth 3 times daily With 300 mg dose to = 900 mg      HYDROcodone-acetaminophen (NORCO) 7.5-325 MG per tablet Take 1-2 tablets by mouth every 6 hours as needed      LORazepam (ATIVAN) 0.5 MG tablet Take 0.5 mg by mouth daily as needed      sildenafil (VIAGRA) 100 MG tablet Take 100 mg by mouth daily as needed      sotalol (BETAPACE) 160 MG tablet Take 160 mg by mouth 2 times daily      traZODone (DESYREL) 100 MG tablet Take 100 mg by mouth At Bedtime       !! - Potential duplicate medications found. Please discuss with provider.        Allergies   No Known Allergies  Data   Most Recent 3 CBC's:  Recent Labs   Lab Test  09/30/18 0818   WBC  5.8   HGB  17.0   MCV  97   PLT  223      Most Recent 3 BMP's:  Recent Labs   Lab Test  09/30/18   1510  09/30/18 0818   NA   --   138   POTASSIUM  4.2  4.2   CHLORIDE   --   102   CO2   --   31   BUN   --   9   CR   --   0.66   ANIONGAP   --   5   MAYURI   --   8.8   GLC   --   114*     Most Recent 2 LFT's:  Recent Labs   Lab Test  09/30/18 0818   AST  31   ALT  49   ALKPHOS  71   BILITOTAL  0.7     Most Recent INR's and Anticoagulation Dosing History:  Anticoagulation Dose  History     Recent Dosing and Labs Latest Ref Rng & Units 9/30/2018    INR 0.86 - 1.14 0.99        Most Recent 3 Troponin's:  Recent Labs   Lab Test  09/30/18   0818   TROPI  <0.015     Most Recent Cholesterol Panel:  Recent Labs   Lab Test  09/30/18   0818   CHOL  141   LDL  48   HDL  79   TRIG  69     Most Recent 6 Bacteria Isolates From Any Culture (See EPIC Reports for Culture Details):No lab results found.  Most Recent TSH, T4 and A1c Labs:  Recent Labs   Lab Test  09/30/18   1510   TSH  1.08     Results for orders placed or performed during the hospital encounter of 10/02/17   DX Hip/Pelvis/Spine    Narrative    DX HIP/PELVIS/SPINE  10/2/2017 8:56 AM    HISTORY:  Noninfective gastroenteritis and colitis, unspecified    FINDINGS: This DEXA scan was performed using a [employer] scanner.   DEXA results are reported according to T-score.  The T-score is the  standard deviation from the peak bone mass in a normal young adult  population.  In accordance with the ISCD (International Society of  Clinical Densitometry), the lower of the total proximal femur vs  femoral neck T-score is reported.  Osteopenia is defined as a T-score  of -1.0 to -2.5.  Osteoporosis is defined as a T-score of less than  -2.5.    T-SCORES:  Lumbar Spine L1-L4 T-score: 0.6    Left Hip (Neck) T-score: -1.1  Right Hip (Neck) T-score: -1.3    Hip lowest neck BMD: 0.906 gm/cm2.    FRAX 10-YEAR PROBABILITY OF FRACTURE*:  Major Osteoporotic: 9%  Hip: 2%    *All treatment decisions require clinical judgment and consideration  of individual patient factors which may not be captured in the FRAX  model and the risk of fracture may be over- or under-estimated by  FRAX.      Impression    IMPRESSION: Bilateral hip osteopenia.    SINTIA BERNARD MD

## 2018-10-01 NOTE — ANESTHESIA PREPROCEDURE EVALUATION
Anesthesia Evaluation     .             ROS/MED HX    ENT/Pulmonary:     (+)tobacco use, Current use , . .   (-) asthma, COPD and sleep apnea   Neurologic:      (-) seizures, CVA and TIA   Cardiovascular:     (+) ----. : . . . :. dysrhythmias a-fib, .      (-) hypertension, CAD and CHF   METS/Exercise Tolerance:     Hematologic:         Musculoskeletal:         GI/Hepatic:        (-) GERD and liver disease   Renal/Genitourinary:      (-) renal disease   Endo:      (-) Type I DM and Type II DM   Psychiatric:         Infectious Disease:         Malignancy:         Other:                     Physical Exam  Normal systems: dental    Airway   Mallampati: III  TM distance: >3 FB  Neck ROM: full    Dental     Cardiovascular   Rhythm and rate: regular      Pulmonary    breath sounds clear to auscultation                    Anesthesia Plan      History & Physical Review  History and physical reviewed and following examination; no interval change.    ASA Status:  3 .    NPO Status:  > 8 hours    Plan for MAC          Postoperative Care      Consents  Anesthetic plan, risks, benefits and alternatives discussed with:  Patient..                          .

## 2018-10-01 NOTE — ANESTHESIA POSTPROCEDURE EVALUATION
Patient: Richie Hines    Procedure(s):  KI, CARDIOVERSION    Diagnosis:UNKNOWN  Diagnosis Additional Information: No value filed.    Anesthesia Type:  MAC    Note:  Anesthesia Post Evaluation    Patient location during evaluation: Bedside  Patient participation: Able to fully participate in evaluation  Level of consciousness: awake and alert  Pain management: adequate  Airway patency: patent  Cardiovascular status: acceptable  Respiratory status: acceptable  Hydration status: acceptable  PONV: none             Last vitals:  Vitals:    10/01/18 1210 10/01/18 1238 10/01/18 1328   BP: 111/62 94/60    Pulse:      Resp: 16 18    Temp:   36.7  C (98  F)   SpO2:   98%         Electronically Signed By: Melvin Urena MD  October 1, 2018  1:48 PM

## 2018-10-01 NOTE — UTILIZATION REVIEW
"Admission Status; Secondary Review Determination     Under the authority of the Utilization Management Committee, the utilization review process indicated a secondary review on the above patient.  The review outcome is based on review of the medical records, discussions with staff, and applying clinical experience noted on the date of the review.       (x) Observation Status Appropriate - This patient does not meet hospital inpatient criteria and is placed in observation status. If this patient's primary payer is Medicare and was admitted as an inpatient, Condition Code 44 should be used and patient status changed to \"observation\".     RATIONALE FOR DETERMINATION: 62-year-old male with history of ascending aortic aneurysm, coronary calcification and paroxysmal atrial fibrillation on sotalol and aspirin who presents with symptomatic atrial fibrillation with a rapid ventricular response.  Patient is hemodynamically stable with plans for cardioversion and discharged after initial 1 night hospital stay.  Observation care appropriate for initial rate control, arranging KI and cardioversion with follow-up discharge planning.  If patient has persistent difficult to control rapid ventricular response, then would be appropriate to advance to inpatient services.       The severity of illness, intensity of service provided, expected LOS and risk for adverse outcome make the care appropriate for further observation; however, doesn't meet criteria for hospital inpatient admission. This was discussed with attending physician who concurred with this determination.    The information on this document is developed by the utilization review team in order for the business office to ensure compliance.  This only denotes the appropriateness of proper admission status and does not reflect the quality of care rendered.         The definitions of Inpatient Status and Observation Status used in making the determination above are those " provided in the CMS Coverage Manual, Chapter 1 and Chapter 6, section 70.4.      Sincerely,     Vin Donato MD    Physician Advisor  Utilization Review/ Case Management  [facility].

## 2018-10-01 NOTE — PLAN OF CARE
"Problem: Patient Care Overview  Goal: Plan of Care/Patient Progress Review  Outcome: No Change  Pt A/O. VSS. Up with sba/ind. Tele shows Afib CVR/RVR. Pt denies any CP or SOB. Chronic back pain, on Norco for. Plan is for KI/CV today. Hep stopped yesterday, apixaban started. Pt has no new complaints.    /82  Pulse 95  Temp 97.6  F (36.4  C) (Oral)  Resp 16  Ht 1.854 m (6' 1\")  Wt 88.5 kg (195 lb)  SpO2 100%  BMI 25.73 kg/m2        "

## 2018-10-02 ENCOUNTER — CARE COORDINATION (OUTPATIENT)
Dept: CARDIOLOGY | Facility: CLINIC | Age: 63
End: 2018-10-02

## 2018-10-02 NOTE — OP NOTE
Procedure Date: 10/01/2018      ELECTRICAL CARDIOVERSION OF ATRIAL FIBRILLATION       INDICATION:  Atrial fibrillation.      PROCEDURE NOTE:  The patient was seen in the special echo lab prior to KI cardioversion and the risks of the cardioversion were discussed in detail with the patient.  The patient understood the risks and benefits of the procedure and gave written and verbal consent to proceed.  The patient was told to continue Eliquis for a minimum of four weeks uninterrupted for stroke prevention.      The KI showed no evidence of intracardiac thrombus.      Anesthesiology was called to provide general anesthetic.  Once the patient was properly sedated, a single 150 joule biphasic synchronized countershock was delivered with the pads in the anterior and posterior positions, converting the patient to sinus rhythm.      CONCLUSION:  Successful cardioversion of atrial fibrillation to sinus rhythm.  No apparent complications.         MAJO SPENCE MD, [facility]             D: 10/01/2018   T: 10/02/2018   MT: RACHEL      Name:     NADIA FOOTE   MRN:      [id number]        Account:        [id number]   :      1955           Procedure Date: 10/01/2018      Document: [id number]       cc: EDDIE FLORES, CNP

## 2018-10-02 NOTE — PROGRESS NOTES
Patient was evaluated by cardiology while inpatient for symptomatic atrial fibrillation with RVR. Called patient to discuss post hospital d/c questions, and review medication changes. RN left a VM to call RN back.     Pt called back. States his HR is in the 60's. Denies any other symptoms. Pt has his prescription for Eliquis and understands he is to remain on it for 4 weeks. Pt to follow up at [facility] (Dr. Graham). Pt will call here with any questions or concerns regarding his hospitalization.        .

## (undated) RX ORDER — LIDOCAINE HYDROCHLORIDE 40 MG/ML
SOLUTION TOPICAL
Status: DISPENSED
Start: 2018-10-01

## (undated) RX ORDER — NALOXONE HYDROCHLORIDE 0.4 MG/ML
INJECTION, SOLUTION INTRAMUSCULAR; INTRAVENOUS; SUBCUTANEOUS
Status: DISPENSED
Start: 2018-10-01

## (undated) RX ORDER — FENTANYL CITRATE 50 UG/ML
INJECTION, SOLUTION INTRAMUSCULAR; INTRAVENOUS
Status: DISPENSED
Start: 2018-10-01

## (undated) RX ORDER — DEXTROSE MONOHYDRATE 25 G/50ML
INJECTION, SOLUTION INTRAVENOUS
Status: DISPENSED
Start: 2018-10-01

## (undated) RX ORDER — GLYCOPYRROLATE 0.2 MG/ML
INJECTION, SOLUTION INTRAMUSCULAR; INTRAVENOUS
Status: DISPENSED
Start: 2018-10-01

## (undated) RX ORDER — FLUMAZENIL 0.1 MG/ML
INJECTION, SOLUTION INTRAVENOUS
Status: DISPENSED
Start: 2018-10-01